# Patient Record
Sex: FEMALE | Race: WHITE
[De-identification: names, ages, dates, MRNs, and addresses within clinical notes are randomized per-mention and may not be internally consistent; named-entity substitution may affect disease eponyms.]

---

## 2020-02-28 ENCOUNTER — HOSPITAL ENCOUNTER (INPATIENT)
Dept: HOSPITAL 93 - ER | Age: 50
LOS: 1 days | Discharge: HOME | DRG: 343 | End: 2020-02-29
Attending: SURGERY | Admitting: SURGERY
Payer: COMMERCIAL

## 2020-02-28 VITALS — WEIGHT: 135 LBS | HEIGHT: 68 IN | BODY MASS INDEX: 20.46 KG/M2

## 2020-02-28 DIAGNOSIS — K36: Primary | ICD-10-CM

## 2020-02-28 PROCEDURE — 0DTJ4ZZ RESECTION OF APPENDIX, PERCUTANEOUS ENDOSCOPIC APPROACH: ICD-10-PCS | Performed by: SURGERY

## 2020-02-28 PROCEDURE — BW21ZZZ COMPUTERIZED TOMOGRAPHY (CT SCAN) OF ABDOMEN AND PELVIS: ICD-10-PCS | Performed by: RADIOLOGY

## 2020-02-28 NOTE — NUR
PACIENTE ALERTA Y ORIENTADA POR SHABBIR ESFERAS. SE ORIENTA A PACIENTE SOBRE
PROCEDIMIENTO Y TX, REFIERE ENTENDER. EXTRAE MUESTRAS DE LABORATOIRO CON
MEDIDAS ASEPTICAS Y SE ADMINISTRA MEDICAMENTOS DENYS ORDEN MEDICA.

## 2020-02-28 NOTE — NUR
PTE ALERTA Y ORIENTADA X 3 ESFERAS, REFIERE DOLOR ABDOMINAL HACE 2 DEJESUS
APROXIMADAMENTE Y DIARREAS EN EL GAY[place] DE [place]. HOY INDICA NAUSEAS. SE UBICA EN
AREA DE OBSERVACION.